# Patient Record
Sex: MALE | Race: BLACK OR AFRICAN AMERICAN | NOT HISPANIC OR LATINO | URBAN - METROPOLITAN AREA
[De-identification: names, ages, dates, MRNs, and addresses within clinical notes are randomized per-mention and may not be internally consistent; named-entity substitution may affect disease eponyms.]

---

## 2018-04-05 ENCOUNTER — EMERGENCY (EMERGENCY)
Facility: HOSPITAL | Age: 58
LOS: 1 days | Discharge: LEFT BEFORE DISCHARGE | End: 2018-04-05
Attending: EMERGENCY MEDICINE | Admitting: INTERNAL MEDICINE
Payer: COMMERCIAL

## 2018-04-05 VITALS
DIASTOLIC BLOOD PRESSURE: 121 MMHG | RESPIRATION RATE: 19 BRPM | TEMPERATURE: 98 F | SYSTOLIC BLOOD PRESSURE: 189 MMHG | HEART RATE: 90 BPM | OXYGEN SATURATION: 99 %

## 2018-04-05 LAB
APTT BLD: 34.3 SEC — SIGNIFICANT CHANGE UP (ref 27.5–37.4)
BASOPHILS # BLD AUTO: 0.04 K/UL — SIGNIFICANT CHANGE UP (ref 0–0.2)
BASOPHILS NFR BLD AUTO: 0.5 % — SIGNIFICANT CHANGE UP (ref 0–2)
EOSINOPHIL # BLD AUTO: 0.08 K/UL — SIGNIFICANT CHANGE UP (ref 0–0.5)
EOSINOPHIL NFR BLD AUTO: 1 % — SIGNIFICANT CHANGE UP (ref 0–6)
HCT VFR BLD CALC: 36.7 % — LOW (ref 39–50)
HGB BLD-MCNC: 12 G/DL — LOW (ref 13–17)
IMM GRANULOCYTES # BLD AUTO: 0.02 # — SIGNIFICANT CHANGE UP
IMM GRANULOCYTES NFR BLD AUTO: 0.3 % — SIGNIFICANT CHANGE UP (ref 0–1.5)
INR BLD: 0.99 — SIGNIFICANT CHANGE UP (ref 0.88–1.17)
LYMPHOCYTES # BLD AUTO: 4.06 K/UL — HIGH (ref 1–3.3)
LYMPHOCYTES # BLD AUTO: 52 % — HIGH (ref 13–44)
MCHC RBC-ENTMCNC: 31.4 PG — SIGNIFICANT CHANGE UP (ref 27–34)
MCHC RBC-ENTMCNC: 32.7 % — SIGNIFICANT CHANGE UP (ref 32–36)
MCV RBC AUTO: 96.1 FL — SIGNIFICANT CHANGE UP (ref 80–100)
MONOCYTES # BLD AUTO: 0.73 K/UL — SIGNIFICANT CHANGE UP (ref 0–0.9)
MONOCYTES NFR BLD AUTO: 9.3 % — SIGNIFICANT CHANGE UP (ref 2–14)
NEUTROPHILS # BLD AUTO: 2.88 K/UL — SIGNIFICANT CHANGE UP (ref 1.8–7.4)
NEUTROPHILS NFR BLD AUTO: 36.9 % — LOW (ref 43–77)
NRBC # FLD: 0 — SIGNIFICANT CHANGE UP
PLATELET # BLD AUTO: 236 K/UL — SIGNIFICANT CHANGE UP (ref 150–400)
PMV BLD: 9.2 FL — SIGNIFICANT CHANGE UP (ref 7–13)
PROTHROM AB SERPL-ACNC: 11 SEC — SIGNIFICANT CHANGE UP (ref 9.8–13.1)
RBC # BLD: 3.82 M/UL — LOW (ref 4.2–5.8)
RBC # FLD: 12.1 % — SIGNIFICANT CHANGE UP (ref 10.3–14.5)
WBC # BLD: 7.81 K/UL — SIGNIFICANT CHANGE UP (ref 3.8–10.5)
WBC # FLD AUTO: 7.81 K/UL — SIGNIFICANT CHANGE UP (ref 3.8–10.5)

## 2018-04-05 PROCEDURE — 99285 EMERGENCY DEPT VISIT HI MDM: CPT | Mod: 25

## 2018-04-05 PROCEDURE — 93010 ELECTROCARDIOGRAM REPORT: CPT

## 2018-04-05 NOTE — ED PROVIDER NOTE - ATTENDING CONTRIBUTION TO CARE
I performed a face to face bedside interview with patient regarding history of present illness, review of symptoms and past medical history. I completed an independent physical exam.  I have discussed patient's plan of care.   I agree with note as stated above, having amended the EMR as needed to reflect my findings. I have discussed the assessment and plan of care.  This includes during the time I functioned as the attending physician for this patient.  Attending Contribution to Care: agree with plan of resident. pt p/w exertional dyspnea, dizziness at that period. pt with HEART score over 4. pt will be admitted for anginal equivalent dyspnea, former smoker, cp.

## 2018-04-05 NOTE — ED PROVIDER NOTE - MEDICAL DECISION MAKING DETAILS
dyspnea on exertion, r/o acs, heart score 5, pe unlikely, labs, cxr, ecg, cdu vs admit to tele for further w/u and management

## 2018-04-05 NOTE — ED PROVIDER NOTE - PROGRESS NOTE DETAILS
Pt stable. Labs, ecg, imaging reviewed. HEART score moderate risk. Will admit to tele for r/o acs, better bp control. Pt does not have a cardiologist. Dr. Trevino and Tele PA aware.  Sergio Mares MD PGY-4

## 2018-04-05 NOTE — ED ADULT NURSE NOTE - OBJECTIVE STATEMENT
pt aox3 reports he started taking new BP med 1 week ago because of adverse reaction (cough/sob/weakness); since that he states he has been feeling dizzy/ lightheaded. Pt noted to be hypertensive MD Valiente made of aware of current bp. Pt placed on cardiac monitor in NSR. 20g IV placed in left ac labs sent.  Pt in no acute respiratory distress.  will continue to monitor/assess.

## 2018-04-05 NOTE — ED PROVIDER NOTE - OBJECTIVE STATEMENT
58 y/o M with PMH of HTN, sciatica, obesity p/w exertional dyspnea x 10 days. Pt reports walking few blocks, developing lightheadedness, dyspnea, diaphoresis, unusual for him. Pt also c/o dry cough, mild headache (intermittent, improved with tylenol), occurred after starting Lotrel, which he self-d/c after few days. Pt denies chest pain, fevers, chills, cough, numbness, weakness, hx of dvt/pe, abd pain, nausea, vomiting, vision changes. Pt recently moved from [place], does not have doctor's here. Pt former smoker.

## 2018-04-05 NOTE — ED ADULT TRIAGE NOTE - CHIEF COMPLAINT QUOTE
Pt c/o dizziness/lightheadedness and dry cough since starting a new BP med.  Pt taking lotrel.  Pt states stopped taking it approx 1 week ago.  Pt endorses SOB when walking for long distance.  Pt denies any chest pain/palpitations.  PMHx:  HTN, sciatica

## 2018-04-06 VITALS
TEMPERATURE: 98 F | SYSTOLIC BLOOD PRESSURE: 117 MMHG | RESPIRATION RATE: 18 BRPM | OXYGEN SATURATION: 100 % | DIASTOLIC BLOOD PRESSURE: 117 MMHG | HEART RATE: 87 BPM

## 2018-04-06 DIAGNOSIS — I10 ESSENTIAL (PRIMARY) HYPERTENSION: ICD-10-CM

## 2018-04-06 DIAGNOSIS — R55 SYNCOPE AND COLLAPSE: ICD-10-CM

## 2018-04-06 DIAGNOSIS — R06.09 OTHER FORMS OF DYSPNEA: ICD-10-CM

## 2018-04-06 DIAGNOSIS — Z29.9 ENCOUNTER FOR PROPHYLACTIC MEASURES, UNSPECIFIED: ICD-10-CM

## 2018-04-06 LAB
ALBUMIN SERPL ELPH-MCNC: 4 G/DL — SIGNIFICANT CHANGE UP (ref 3.3–5)
ALP SERPL-CCNC: 43 U/L — SIGNIFICANT CHANGE UP (ref 40–120)
ALT FLD-CCNC: 45 U/L — HIGH (ref 4–41)
AST SERPL-CCNC: 39 U/L — SIGNIFICANT CHANGE UP (ref 4–40)
BASOPHILS NFR SPEC: 2.6 % — HIGH (ref 0–2)
BILIRUB SERPL-MCNC: 0.3 MG/DL — SIGNIFICANT CHANGE UP (ref 0.2–1.2)
BUN SERPL-MCNC: 16 MG/DL — SIGNIFICANT CHANGE UP (ref 7–23)
CALCIUM SERPL-MCNC: 8.8 MG/DL — SIGNIFICANT CHANGE UP (ref 8.4–10.5)
CHLORIDE SERPL-SCNC: 101 MMOL/L — SIGNIFICANT CHANGE UP (ref 98–107)
CK MB BLD-MCNC: 4.09 NG/ML — SIGNIFICANT CHANGE UP (ref 1–6.6)
CK SERPL-CCNC: 262 U/L — HIGH (ref 30–200)
CO2 SERPL-SCNC: 23 MMOL/L — SIGNIFICANT CHANGE UP (ref 22–31)
CREAT SERPL-MCNC: 1.07 MG/DL — SIGNIFICANT CHANGE UP (ref 0.5–1.3)
EOSINOPHIL NFR FLD: 0.9 % — SIGNIFICANT CHANGE UP (ref 0–6)
GIANT PLATELETS BLD QL SMEAR: PRESENT — SIGNIFICANT CHANGE UP
GLUCOSE SERPL-MCNC: 94 MG/DL — SIGNIFICANT CHANGE UP (ref 70–99)
LYMPHOCYTES NFR SPEC AUTO: 38.3 % — SIGNIFICANT CHANGE UP (ref 13–44)
MACROCYTES BLD QL: SIGNIFICANT CHANGE UP
MICROCYTES BLD QL: SLIGHT — SIGNIFICANT CHANGE UP
MONOCYTES NFR BLD: 11.3 % — HIGH (ref 2–9)
NEUTROPHIL AB SER-ACNC: 40.8 % — LOW (ref 43–77)
NT-PROBNP SERPL-SCNC: 21 PG/ML — SIGNIFICANT CHANGE UP
PLATELET COUNT - ESTIMATE: NORMAL — SIGNIFICANT CHANGE UP
POTASSIUM SERPL-MCNC: 5 MMOL/L — SIGNIFICANT CHANGE UP (ref 3.5–5.3)
POTASSIUM SERPL-SCNC: 5 MMOL/L — SIGNIFICANT CHANGE UP (ref 3.5–5.3)
PROT SERPL-MCNC: 7.5 G/DL — SIGNIFICANT CHANGE UP (ref 6–8.3)
SMUDGE CELLS # BLD: PRESENT — SIGNIFICANT CHANGE UP
SODIUM SERPL-SCNC: 138 MMOL/L — SIGNIFICANT CHANGE UP (ref 135–145)
TROPONIN T SERPL-MCNC: < 0.06 NG/ML — SIGNIFICANT CHANGE UP (ref 0–0.06)
VARIANT LYMPHS # BLD: 6.1 % — SIGNIFICANT CHANGE UP

## 2018-04-06 RX ORDER — ENOXAPARIN SODIUM 100 MG/ML
40 INJECTION SUBCUTANEOUS EVERY 24 HOURS
Qty: 0 | Refills: 0 | Status: DISCONTINUED | OUTPATIENT
Start: 2018-04-06 | End: 2018-04-06

## 2018-04-06 RX ORDER — AMLODIPINE BESYLATE 2.5 MG/1
10 TABLET ORAL DAILY
Qty: 0 | Refills: 0 | Status: DISCONTINUED | OUTPATIENT
Start: 2018-04-06 | End: 2018-04-06

## 2018-04-06 RX ORDER — ASPIRIN/CALCIUM CARB/MAGNESIUM 324 MG
81 TABLET ORAL DAILY
Qty: 0 | Refills: 0 | Status: DISCONTINUED | OUTPATIENT
Start: 2018-04-06 | End: 2018-04-06

## 2018-04-06 RX ORDER — LISINOPRIL 2.5 MG/1
20 TABLET ORAL DAILY
Qty: 0 | Refills: 0 | Status: DISCONTINUED | OUTPATIENT
Start: 2018-04-06 | End: 2018-04-06

## 2018-04-06 RX ORDER — SODIUM CHLORIDE 9 MG/ML
3 INJECTION INTRAMUSCULAR; INTRAVENOUS; SUBCUTANEOUS EVERY 8 HOURS
Qty: 0 | Refills: 0 | Status: DISCONTINUED | OUTPATIENT
Start: 2018-04-06 | End: 2018-04-06

## 2018-04-06 RX ORDER — ASPIRIN/CALCIUM CARB/MAGNESIUM 324 MG
162 TABLET ORAL ONCE
Qty: 0 | Refills: 0 | Status: COMPLETED | OUTPATIENT
Start: 2018-04-06 | End: 2018-04-06

## 2018-04-06 RX ADMIN — Medication 162 MILLIGRAM(S): at 02:05

## 2018-04-06 NOTE — DISCHARGE NOTE ADULT - MEDICATION SUMMARY - MEDICATIONS TO TAKE
I will START or STAY ON the medications listed below when I get home from the hospital:    amlodipine-benazepril 10 mg-20 mg oral capsule  -- 1 cap(s) by mouth once a day  -- Indication: For Hypertension, unspecified type

## 2018-04-06 NOTE — DISCHARGE NOTE ADULT - CARE PLAN
Principal Discharge DX:	Hypertension, unspecified type  Goal:	Keep BP < 140/ 90  Assessment and plan of treatment:	Continue BP med.  Low salt diet.  Return to Ed if symptoms persist.  Secondary Diagnosis:	Near syncope  Goal:	Keep hydrated and return to Ed if symptoms persist.

## 2018-04-06 NOTE — DISCHARGE NOTE ADULT - PATIENT PORTAL LINK FT
You can access the [facility] Portal, offered by [facility], by registering with the following website: [email or web address]

## 2018-04-06 NOTE — H&P ADULT - RS GEN PE MLT RESP DETAILS PC
no wheezes/respirations non-labored/no rhonchi/good air movement/no subcutaneous emphysema/airway patent/breath sounds equal/clear to auscultation bilaterally/no rales/no chest wall tenderness/no intercostal retractions

## 2018-04-06 NOTE — H&P ADULT - NEGATIVE ENMT SYMPTOMS
no ear pain/no tinnitus/no vertigo/no nasal discharge/no nose bleeds/no nasal obstruction/no sinus symptoms/no nasal congestion/no post-nasal discharge

## 2018-04-06 NOTE — H&P ADULT - HISTORY OF PRESENT ILLNESS
57M with a history of HTN, started on a CCB and ACE combination Rx by a PCP in [place], had dietary indiscretions with pepperoni pizza on 4/4, experiencing dizziness upon standing, HA and dry cough. [place] after ambulating two blocks.  Denies LOC, falls, tinnitus, chest pain , palpitations, nausea, vomit, diaphoresis, chills.

## 2018-04-06 NOTE — DISCHARGE NOTE ADULT - PLAN OF CARE
Keep BP < 140/ 90 Continue BP med.  Low salt diet.  Return to Ed if symptoms persist. Keep hydrated and return to Ed if symptoms persist.

## 2018-04-06 NOTE — H&P ADULT - NSHPLABSRESULTS_GEN_ALL_CORE
CXR= Cardiomegaly with with clear lungs.  H &H = 12/ 36.7  BUN/ Creatinine= 10/ 1.07  #[id number]  #1MB= 4.09  #1 Trop < 0.06  BMP= 21.0  EKG NSR @ 77b/ min, QT/ QTC= 370/ 418

## 2018-04-06 NOTE — H&P ADULT - GASTROINTESTINAL DETAILS
soft/nontender/bowel sounds normal/no organomegaly/no rigidity/no bruit/no guarding/no rebound tenderness

## 2018-04-06 NOTE — DISCHARGE NOTE ADULT - CARE PROVIDER_API CALL
Alexander Trevino), Cardiovascular Disease; Internal Medicine; Nuclear Cardiology  [address] [place] [address]  Phone: [phone]  Fax: [phone]

## 2018-04-06 NOTE — H&P ADULT - NEUROLOGICAL DETAILS
sensation intact/alert and oriented x 3/responds to verbal commands/normal strength/no spontaneous movement

## 2018-04-06 NOTE — H&P ADULT - NEGATIVE OPHTHALMOLOGIC SYMPTOMS
no lacrimation L/no lacrimation R/no photophobia/no discharge L/no discharge R/no blurred vision L/no blurred vision R

## 2018-04-13 RX ORDER — AMLODIPINE BESYLATE AND BENAZEPRIL HYDROCHLORIDE 10; 20 MG/1; MG/1
1 CAPSULE ORAL
Qty: 0 | Refills: 0 | COMMUNITY
